# Patient Record
Sex: FEMALE
[De-identification: names, ages, dates, MRNs, and addresses within clinical notes are randomized per-mention and may not be internally consistent; named-entity substitution may affect disease eponyms.]

---

## 2017-06-02 PROBLEM — Z00.00 ENCOUNTER FOR PREVENTIVE HEALTH EXAMINATION: Status: ACTIVE | Noted: 2017-06-02

## 2018-05-15 ENCOUNTER — RESULT REVIEW (OUTPATIENT)
Age: 58
End: 2018-05-15

## 2021-07-22 ENCOUNTER — EMERGENCY (EMERGENCY)
Facility: HOSPITAL | Age: 61
LOS: 1 days | Discharge: HOME | End: 2021-07-22
Attending: EMERGENCY MEDICINE | Admitting: EMERGENCY MEDICINE
Payer: MEDICAID

## 2021-07-22 VITALS
OXYGEN SATURATION: 100 % | HEART RATE: 94 BPM | RESPIRATION RATE: 18 BRPM | SYSTOLIC BLOOD PRESSURE: 131 MMHG | DIASTOLIC BLOOD PRESSURE: 94 MMHG

## 2021-07-22 VITALS
DIASTOLIC BLOOD PRESSURE: 76 MMHG | RESPIRATION RATE: 16 BRPM | OXYGEN SATURATION: 100 % | SYSTOLIC BLOOD PRESSURE: 129 MMHG | TEMPERATURE: 98 F | HEART RATE: 96 BPM

## 2021-07-22 PROCEDURE — 99284 EMERGENCY DEPT VISIT MOD MDM: CPT

## 2021-07-22 NOTE — ED PROVIDER NOTE - PATIENT PORTAL LINK FT
You can access the FollowMyHealth Patient Portal offered by [facility] by registering at the following website: [email or web address]. By joining [employer]’s FollowMyHealth portal, you will also be able to view your health information using other applications (apps) compatible with our system.

## 2021-07-22 NOTE — ED ADULT NURSE NOTE - OBJECTIVE STATEMENT
61 year old female presents A&Ox4, brought in by EMS. [place]y [place]. Pt states she smoked marijuana about an hour ago, and ya dust. Pt denies any other drug use. States this is not her first time smoking marijuana, she smokes it daily. Pt complaining of epigastric pain 2/10 that started yesterday morning. Even unlabored respirations, sating 98% on room air, speaking in full sentences. Pt denies any chest pain, dyspnea, dizziness, blurry vision, nausea, vomiting, diarrhea, recent falls, loss of consciousness, trauma to head neck or spine. Pt in NAD at this time. 61 year old female presents A&Ox4, brought in by EMS. Pt was found in the train station with unsteady gait. Pt states she smoked marijuana about an hour ago, and ya dust. Pt denies any other drug use. States this is not her first time smoking marijuana, she smokes it daily. Pt complaining of epigastric pain 2/10 that started yesterday morning. Even unlabored respirations, sating 98% on room air, speaking in full sentences. Pt denies any chest pain, dyspnea, dizziness, blurry vision, nausea, vomiting, diarrhea, recent falls, loss of consciousness, trauma to head neck or spine. Pt in NAD at this time. Vital signs obtained. MD aware of elevated blood pressure. Pt on cardiac monitor - normal sinus rhythm. Awaiting further orders. Will continue to monitor.

## 2021-07-22 NOTE — ED ADULT TRIAGE NOTE - CHIEF COMPLAINT QUOTE
pt brought in for drug use. Pt admits to smoking weed and ya dust. Pt states she has done this before. pt was found on [facility] and [facility] called 911. Pt states she has some epigastric pain. Pt eyes noted to be blood shot.

## 2021-07-22 NOTE — ED PROVIDER NOTE - PROGRESS NOTE DETAILS
Pt feeling much better, clinically sober, passed po and ambulatory trial, asking to be discharged home. Will take bus home.  -Dr. Zamora

## 2021-07-22 NOTE — ED PROVIDER NOTE - NSFOLLOWUPINSTRUCTIONS_ED_ALL_ED_FT
Please stop using illegal drugs as discussed.    Call the number(s) provided for substance abuse services.    Return to the emergency department if you develop any symptoms that are concerning to you.

## 2021-07-22 NOTE — ED PROVIDER NOTE - CLINICAL SUMMARY MEDICAL DECISION MAKING FREE TEXT BOX
1.91 62yo F w/ pmh as above bibems for unsteadiness while in train station likely 2/2 to recent drug use. No reason for emergent psych consult at this time. Pt does not have any family member who can pick her up from ED at this time. Will give food, and reassess in a couple of hours for discharge. Of note, offered pt information for drug detox services - she accepted.

## 2021-07-22 NOTE — ED ADULT NURSE NOTE - CAS DISCH CONDITION
Assessment/Plan:    Anxiety    She has not noticed any major differences since starting the Lexapro 10 mg daily  She does note some improvement for acute episodes of anxiety with hydroxyzine 10 mg  We discussed hydroxyzine dose and I told her she could increase to 20 or 30 mg prn  If she finds higher dose is more effective will give rx for 25 mg tabs  Attention deficit hyperactivity disorder (ADHD), combined type  Well controlled on Adderall  Other headache syndrome    Unfortunately she has noted some increase in headaches since starting birth control pills  She will continue with headache diary  She does fine Maxalt very helpful for true migraine headaches, but she has been needing them usually twice per week  Discussed headache prevention with magnesium, riboflavin and melatonin       Diagnoses and all orders for this visit:    Attention deficit hyperactivity disorder (ADHD), combined type    Anxiety  -     hydrOXYzine HCL (ATARAX) 10 mg tablet; Take 1-3 tabs po up to bid prn anxiety    Intractable chronic migraine without aura and without status migrainosus  -     rizatriptan (MAXALT-MLT) 10 MG disintegrating tablet; May repeat in 2 hours if needed    Chronic tension-type headache, intractable  -     rizatriptan (MAXALT-MLT) 10 MG disintegrating tablet; May repeat in 2 hours if needed    Other headache syndrome    Other orders  -     norgestimate-ethinyl estradiol (ORTHO TRI-CYCLEN LO) 0 18/0 215/0 25 MG-25 MCG per tablet; Take 1 tablet by mouth daily  -     Tri-Lo-Wendy 0 18/0 215/0 25 MG-25 MCG per tablet          Subjective:      Patient ID: Mardelle Fothergill is a 23 y o  female  She is here for follow-up of anxiety along headaches  The Adderall has been working well for her ADHD  She has been on Lexapro  just over a month and has not noticed any major difference in anxiety symptoms  She does find hydroxyzine is helpful for an acute episode        Her gynecologist started her on birth control pills about 3 weeks ago  Unfortunately she notes increase in headaches since she has been taking the pills  She has discussed this with the gynecologist and they have decided that she should continue for a 3 month trial       The Maxalt has been working very well for true migraine headaches  She notes some sleeping difficulty  Usually she falls asleep okay but then wakes up and has trouble returning to sleep  The following portions of the patient's history were reviewed and updated as appropriate: allergies, current medications, past family history, past medical history, past social history, past surgical history and problem list     Review of Systems   Constitutional: Negative for activity change, chills, fatigue and fever  HENT: Negative for congestion, ear pain, sinus pressure and sore throat  Eyes: Negative for pain and visual disturbance  Respiratory: Negative for cough, chest tightness, shortness of breath and wheezing  Cardiovascular: Negative for chest pain, palpitations and leg swelling  Gastrointestinal: Negative for abdominal pain, blood in stool, constipation, diarrhea, nausea and vomiting  Endocrine: Negative for polydipsia and polyuria  Genitourinary: Negative for difficulty urinating, dysuria, frequency and urgency  Musculoskeletal: Negative for arthralgias, joint swelling and myalgias  Skin: Negative for rash  Neurological: Positive for headaches  Negative for dizziness, weakness and numbness  Hematological: Negative for adenopathy  Does not bruise/bleed easily  Psychiatric/Behavioral: Positive for sleep disturbance  Negative for dysphoric mood  The patient is not nervous/anxious  Objective:      /74 (BP Location: Left arm, Patient Position: Sitting, Cuff Size: Standard)   Pulse 84   Resp 12   Ht 5' 5" (1 651 m)   Wt 52 4 kg (115 lb 9 6 oz)   SpO2 98%   BMI 19 24 kg/m²          Physical Exam  Vitals signs and nursing note reviewed  Constitutional:       Appearance: Normal appearance  HENT:      Head: Normocephalic and atraumatic  Cardiovascular:      Rate and Rhythm: Normal rate and regular rhythm  Pulmonary:      Effort: Pulmonary effort is normal       Breath sounds: Normal breath sounds  Musculoskeletal:      Right lower leg: No edema  Left lower leg: No edema  Neurological:      Mental Status: She is alert  Stable

## 2021-07-22 NOTE — ED PROVIDER NOTE - PHYSICAL EXAMINATION
Gen: Alert, NAD  Head: NC, AT,  EOMI, normal lids/conjunctiva  ENT:  normal hearing, patent oropharynx without erythema/exudate  Neck: +supple, no tenderness/meningismus/JVD, +Trachea midline  Chest: no chest wall tenderness, equal chest rise  Pulm: Bilateral BS, normal resp effort, no wheeze/stridor/retractions  CV: mildly tachy, no M/R/G, +dist pulses  Abd: +BS, soft, NT/ND  Rectal: deferred  Mskel: no edema/erythema/cyanosis  Skin: no rash  Neuro: AAOx3, no sensory/motor deficits, CN 2-12 intact

## 2021-07-22 NOTE — ED PROVIDER NOTE - ATTENDING CONTRIBUTION TO CARE
History and physical above obtained and documented (or dictated) by me (attending physician).  Resident involved in case for assistance with disposition and may document involvement as necessary via progress note(s).   -Dr. Zamora

## 2021-07-22 NOTE — ED ADULT NURSE NOTE - CHIEF COMPLAINT QUOTE
pt brought in for drug use. Pt admits to smoking weed and ya dust. Pt states she has done this before. pt was found on [facility] and [facility] called 911. Pt states she has some epigastric pain. Pt eyes noted to be blood shot. 100

## 2021-07-22 NOTE — ED PROVIDER NOTE - OBJECTIVE STATEMENT
Pertinent PMH/PSH/FHx/SHx and Review of Systems contained within:  62yo F w/ pmh of htn and hypothryoidism; PPH of depression, bibems after seen stumbling and unsteady in train station. Pt endorses smoking cannabis multiple times per week and smoking pcp occasionally. States she smoked pcp today because she was feeling lonely and "stressed about all of the crazy things happening on the news". Denies SI/HI/AVH. Denies recent etoh use or any hx of etoh abuse/withdrawal. States time of last drug use was while she was waiting on the train platform a couple of hours ago. Feels much better now but still not completely back to baseline. No physical complaints at this time other than being hungry and asking for food.    No fever/chills, No photophobia/eye pain/changes in vision, No ear pain/sore throat/dysphagia, No chest pain/palpitations, no SOB/cough/wheeze/stridor, No abdominal pain, No N/V/D, no dysuria/frequency/discharge, No neck/back pain, no rash, no new focal neuro symptoms.

## 2021-07-22 NOTE — ED ADULT NURSE NOTE - NSIMPLEMENTINTERV_GEN_ALL_ED
Implemented All Fall Risk Interventions:  [place] to call system. Call bell, personal items and telephone within reach. Instruct patient to call for assistance. Room bathroom lighting operational. Non-slip footwear when patient is off stretcher. Physically safe environment: no spills, clutter or unnecessary equipment. Stretcher in lowest position, wheels locked, appropriate side rails in place. Provide visual cue, wrist band, yellow gown, etc. Monitor gait and stability. Monitor for mental status changes and reorient to person, place, and time. Review medications for side effects contributing to fall risk. Reinforce activity limits and safety measures with patient and family.

## 2021-08-03 ENCOUNTER — EMERGENCY (EMERGENCY)
Facility: HOSPITAL | Age: 61
LOS: 1 days | Discharge: HOME | End: 2021-08-03
Attending: EMERGENCY MEDICINE | Admitting: EMERGENCY MEDICINE
Payer: MEDICAID

## 2021-08-03 VITALS
OXYGEN SATURATION: 100 % | HEART RATE: 82 BPM | DIASTOLIC BLOOD PRESSURE: 70 MMHG | TEMPERATURE: 98 F | RESPIRATION RATE: 16 BRPM | SYSTOLIC BLOOD PRESSURE: 136 MMHG

## 2021-08-03 PROCEDURE — 99282 EMERGENCY DEPT VISIT SF MDM: CPT

## 2021-08-03 NOTE — ED ADULT TRIAGE NOTE - CHIEF COMPLAINT QUOTE
presents for pain "all over" as per EMS patient reports having no AC at home and  wants a place to stay. PMH HTN and unknown Psych history.

## 2021-08-04 VITALS
TEMPERATURE: 98 F | OXYGEN SATURATION: 100 % | RESPIRATION RATE: 17 BRPM | HEART RATE: 81 BPM | SYSTOLIC BLOOD PRESSURE: 133 MMHG | DIASTOLIC BLOOD PRESSURE: 92 MMHG

## 2021-08-04 NOTE — ED PROVIDER NOTE - PATIENT PORTAL LINK FT
None available
You can access the FollowMyHealth Patient Portal offered by [facility] by registering at the following website: [email or web address]. By joining [employer]’s FollowMyHealth portal, you will also be able to view your health information using other applications (apps) compatible with our system.

## 2021-08-04 NOTE — ED PROVIDER NOTE - PHYSICAL EXAMINATION
Gen: Well appearing and in NAD.  Head: normal appearing atraumatic.   Neck: trachea midline.  Resp:  No respiratory distress.  Abd; soft NT ND.  Ext: no visible deformities.  Neuro:  Alert and oriented, appears non focal.  Skin:  Warm and dry as visualized.  Psych:  Normal affect and mood.

## 2021-08-04 NOTE — ED PROVIDER NOTE - OBJECTIVE STATEMENT
62 yo F with hx of HTN 62 yo F with hx of HTN BIBEMS initially presenting with diffuse body pain, states that AC was not working and wanted place to stay. No acute complaints.

## 2021-08-04 NOTE — ED PROVIDER NOTE - NS ED ROS FT
Constitutional:  (-) fever, (-) chills, (-) lethargy  Eyes:  (-) eye pain (-) visual changes  ENMT: (-) nasal discharge, (-) sore throat. (-) neck pain or stiffness  Cardiac: (-) chest pain (-) palpitations  Respiratory:  (-) cough (-) shortness of breath  GI:  (-) nausea (-) vomiting (-) diarrhea (-) abdominal pain  :  (-) dysuria (-) frequency (-) burning  MS:  (-) back pain (-) joint pain  Neuro:  (-) headache (-) numbness (-) tingling (-) focal weakness  Skin:  (-) rash  Except as documented in the HPI,  all other systems are negative

## 2021-08-04 NOTE — ED PROVIDER NOTE - NSFOLLOWUPINSTRUCTIONS_ED_ALL_ED_FT
You were evaluated in the Emergency Department for medical screening.     There are no signs of emergency conditions requiring admission to the hospital on today's workup.      We recommend that you:  1. See your primary care physician within the next week for follow up.  Bring a copy of your discharge paperwork (including any test results) to your doctor.      *** Return immediately if you have any other new/concerning symptoms. ***

## 2021-08-04 NOTE — ED PROVIDER NOTE - ATTENDING CONTRIBUTION TO CARE
60 yo with HTN presenting after calling EMS and stating she was having full body pain but currently stating that she is not in pain and that her AC is broken and needed a place to stay that was cool.  No other complaints    Gen:  Well appearing in NAD  Head:  NC/AT  Resp: No distress   Ext: no deformities  Skin: warm and dry as visualized     60 yo here for lack of AC.  No acute medical interventions needed.  Will allow patient to sleep in ED and attempt to dispo in the AM.
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
## 2021-08-04 NOTE — ED ADULT NURSE REASSESSMENT NOTE - NS ED NURSE REASSESS COMMENT FT1
Pt A&Ox4 resting in bed. Even unlabored respirations, speaking in full sentences without any difficulty, no accessory muscle use. Pt denies any chest pain, dyspnea, dizziness, blurry vision, nausea, vomiting, diarrhea. Pt refusing vital  signs at this time, states "I just need to leave this place." Pt in NAD at this time. Awaiting discharge.

## 2021-08-04 NOTE — ED ADULT NURSE NOTE - OBJECTIVE STATEMENT
Break covering RN: 60 y/o F received to room 24 c/o generalized body pain. Pt respirations even and unlabored. pt states pain started earlier this morning. Pt refusing to participate in assessment at this time. Pt respirations even and unlabored. pt abd soft nontender nondistended. pt skin intact. Pt denies fevers, chills, sob, quach ha, or cp. VS as noted, call bell in reach, comfort measures provided, will continue to monitor.

## 2021-08-04 NOTE — ED PROVIDER NOTE - CLINICAL SUMMARY MEDICAL DECISION MAKING FREE TEXT BOX
62 yo F with hx of HTN BIBEMS initially presenting with diffuse body pain, states that AC was not working and wanted place to stay. Upon evaluation patient resting comfortably, vss, no acute complaints. No additional testing indicated at this time. Will reassess.

## 2021-08-16 ENCOUNTER — EMERGENCY (EMERGENCY)
Facility: HOSPITAL | Age: 61
LOS: 1 days | Discharge: HOME | End: 2021-08-16
Attending: STUDENT IN AN ORGANIZED HEALTH CARE EDUCATION/TRAINING PROGRAM | Admitting: STUDENT IN AN ORGANIZED HEALTH CARE EDUCATION/TRAINING PROGRAM
Payer: MEDICAID

## 2021-08-16 VITALS
TEMPERATURE: 98 F | HEART RATE: 89 BPM | OXYGEN SATURATION: 99 % | SYSTOLIC BLOOD PRESSURE: 122 MMHG | DIASTOLIC BLOOD PRESSURE: 98 MMHG | RESPIRATION RATE: 18 BRPM

## 2021-08-16 PROCEDURE — 99283 EMERGENCY DEPT VISIT LOW MDM: CPT

## 2021-08-16 NOTE — ED PROVIDER NOTE - OBJECTIVE STATEMENT
Karina RUVALCABA: 61F hx of HTN, presents to ED s/p arrest for shop lifting, told PDs she needed her meds refilled prompting ED visit, pt requesting Seroquel and oxycodone, otherwise has no physical complaints, frequently coming to staff desk asking for a metro card and a sandwich and wants to leave. She has no physical complaints at this time. Denies n/v/f/c/cp/sob. Denies headache, syncope, lightheadedness, dizziness. Denies chest palpitations, abdominal pain. Denies edema. Denies dysuria, hematuria, BRBPR, tarry stools, diarrhea, constipation.

## 2021-08-16 NOTE — ED PROVIDER NOTE - ATTENDING CONTRIBUTION TO CARE
I have personally performed a face to face medical and diagnostic evaluation of the patient. I have discussed with and reviewed the Fellow's note and agree with the History, ROS, Physical Exam and MDM unless otherwise indicated. A brief summary of my personal evaluation and impression can be found below.    Karina RUVALCABA: Please see the HPI, ROS, PE and MDM as authored by me.

## 2021-08-16 NOTE — ED PROVIDER NOTE - PATIENT PORTAL LINK FT
You can access the FollowMyHealth Patient Portal offered by [facility] by registering at the following website: [email or web address]. By joining [employer]’s FollowMyHealth portal, you will also be able to view your health information using other applications (apps) compatible with our system.

## 2021-08-16 NOTE — ED ADULT TRIAGE NOTE - CHIEF COMPLAINT QUOTE
Pt brought in by EMS from [employer] [facility] [employer]. Pt was shoplifting but told PD that she needed her medications refilled. Pt states she has not had any of her medications in 3 weeks. Pt denies any complaints.

## 2021-08-16 NOTE — ED PROVIDER NOTE - CLINICAL SUMMARY MEDICAL DECISION MAKING FREE TEXT BOX
Karina RUVALCABA:61F hx of HTN, presents to ED s/p arrest for shop lifting, told PDs she needed her meds refilled prompting ED visit, pt requesting Seroquel and oxycodone, otherwise has no physical complaints, frequently coming to staff desk asking for a metro card and a sandwich and wants to leave. She has no physical complaints at this time. exam vss non toxic PE as above well appearing tolerating PO, asking to be dc'd, instructed pt to follow up w PMD for med refill, that oxy will not be refilled in ED. agrees w plan for dc. Karina RUVALCABA:61F hx of HTN, presents to ED s/p arrest for shop lifting, told PDs she needed her meds refilled prompting ED visit, pt requesting Seroquel and oxycodone, otherwise has no physical complaints, frequently coming to staff desk asking for a metro card and a sandwich and wants to leave. She has no physical complaints at this time. exam vss non toxic PE as above well appearing tolerating PO, asking to be dc'd, instructed pt to follow up w PMD for med refill, that oxy will not be refilled in ED. agrees w plan for dc. Agrees to follow up with PCP to discuss outpatient management of her seroquel.

## 2021-08-16 NOTE — ED PROVIDER NOTE - PHYSICAL EXAMINATION
Karina RUVALCABA:  VITALS: Initial triage and subsequent vitals have been reviewed by me.  GEN APPEARANCE: WDWN, alert and cooperative, non-toxic appearing and in NAD  HEAD: Atraumatic, normocephalic   EYES: PERRLa, EOMI, vision grossly intact.   EARS: Gross hearing intact.   NOSE: No nasal discharge, no external evidence of epistaxis.   NECK: Supple  CV: RRR, S1S2, no c/r/m/g. No cyanosis or pallor. Extremities warm, well perfused. Cap refill <2 seconds. No bruits.   LUNGS: CTAB. No wheezing. No rales. No rhonchi. No diminished breath sounds.   ABDOMEN: Soft, NTND. No guarding or rebound. No masses.   MSK/EXT: Spine appears normal, no spine point tenderness. No CVA ttp. Normal muscular development. Pelvis stable. No obvious joint or bony deformity, no peripheral edema.   NEURO: Alert, follows commands. Weight bearing normal. Speech normal. Sensation and motor normal x4 extremities.   SKIN: Normal color for race, warm, dry and intact. No evidence of rash.  PSYCH: Normal mood and affect.

## 2021-08-20 ENCOUNTER — EMERGENCY (EMERGENCY)
Facility: HOSPITAL | Age: 61
LOS: 1 days | Discharge: HOME | End: 2021-08-20
Attending: EMERGENCY MEDICINE | Admitting: EMERGENCY MEDICINE
Payer: MEDICAID

## 2021-08-20 VITALS
RESPIRATION RATE: 18 BRPM | OXYGEN SATURATION: 99 % | TEMPERATURE: 98 F | SYSTOLIC BLOOD PRESSURE: 138 MMHG | HEART RATE: 90 BPM | DIASTOLIC BLOOD PRESSURE: 90 MMHG

## 2021-08-20 VITALS
DIASTOLIC BLOOD PRESSURE: 94 MMHG | HEART RATE: 71 BPM | TEMPERATURE: 98 F | SYSTOLIC BLOOD PRESSURE: 149 MMHG | RESPIRATION RATE: 16 BRPM | OXYGEN SATURATION: 100 %

## 2021-08-20 DIAGNOSIS — F31.9 BIPOLAR DISORDER, UNSPECIFIED: ICD-10-CM

## 2021-08-20 DIAGNOSIS — F12.10 CANNABIS ABUSE, UNCOMPLICATED: ICD-10-CM

## 2021-08-20 DIAGNOSIS — F16.10 HALLUCINOGEN ABUSE, UNCOMPLICATED: ICD-10-CM

## 2021-08-20 DIAGNOSIS — F43.10 POST-TRAUMATIC STRESS DISORDER, UNSPECIFIED: ICD-10-CM

## 2021-08-20 PROCEDURE — 99284 EMERGENCY DEPT VISIT MOD MDM: CPT

## 2021-08-20 PROCEDURE — 90792 PSYCH DIAG EVAL W/MED SRVCS: CPT

## 2021-08-20 RX ORDER — ASPIRIN/CALCIUM CARB/MAGNESIUM 324 MG
1 TABLET ORAL
Qty: 30 | Refills: 0
Start: 2021-08-20 | End: 2021-09-18

## 2021-08-20 RX ORDER — HYDROXYZINE HCL 10 MG
1 TABLET ORAL
Qty: 16 | Refills: 0
Start: 2021-08-20 | End: 2021-08-23

## 2021-08-20 RX ORDER — ATORVASTATIN CALCIUM 80 MG/1
1 TABLET, FILM COATED ORAL
Qty: 30 | Refills: 0
Start: 2021-08-20 | End: 2021-09-18

## 2021-08-20 RX ORDER — QUETIAPINE FUMARATE 200 MG/1
1 TABLET, FILM COATED ORAL
Qty: 14 | Refills: 0
Start: 2021-08-20 | End: 2021-09-02

## 2021-08-20 RX ORDER — QUETIAPINE FUMARATE 200 MG/1
25 TABLET, FILM COATED ORAL ONCE
Refills: 0 | Status: COMPLETED | OUTPATIENT
Start: 2021-08-20 | End: 2021-08-20

## 2021-08-20 RX ORDER — FENOFIBRATE,MICRONIZED 130 MG
1 CAPSULE ORAL
Qty: 30 | Refills: 0
Start: 2021-08-20 | End: 2021-09-18

## 2021-08-20 RX ORDER — LOSARTAN/HYDROCHLOROTHIAZIDE 100MG-25MG
1 TABLET ORAL
Qty: 14 | Refills: 0
Start: 2021-08-20 | End: 2021-09-02

## 2021-08-20 RX ORDER — LEVOTHYROXINE SODIUM 125 MCG
1 TABLET ORAL
Qty: 30 | Refills: 0
Start: 2021-08-20 | End: 2021-09-18

## 2021-08-20 RX ADMIN — QUETIAPINE FUMARATE 25 MILLIGRAM(S): 200 TABLET, FILM COATED ORAL at 13:51

## 2021-08-20 NOTE — ED BEHAVIORAL HEALTH ASSESSMENT NOTE - VIOLENCE PROTECTIVE FACTORS:
Relationship stability/Sobriety Relationship stability/Sobriety/Insight into violence risk and need for management/treatment

## 2021-08-20 NOTE — ED BEHAVIORAL HEALTH ASSESSMENT NOTE - OTHER PAST PSYCHIATRIC HISTORY (INCLUDE DETAILS REGARDING ONSET, COURSE OF ILLNESS, INPATIENT/OUTPATIENT TREATMENT)
History of bipolar I disorder, depression, cannabis use disorder, PCP use. Seeks counseling at [facility]. Recently ran out of Seroquel and requesting more. History of bipolar I disorder, depression, cannabis use disorder, PCP use. Seeks counseling at [facility]. Recently ran out of Seroquel and requesting more.  - recently seen at our [facility] ED requesting for refill of her seroquel
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
## 2021-08-20 NOTE — ED BEHAVIORAL HEALTH ASSESSMENT NOTE - SAFETY PLAN ADDT'L DETAILS
Safety plan discussed with.../Education provided regarding environmental safety / lethal means restriction/Provision of National Suicide Prevention Lifeline 0-698-372-WOLT (8037)

## 2021-08-20 NOTE — ED BEHAVIORAL HEALTH ASSESSMENT NOTE - NSPRESENTSXS_PSY_ALL_CORE
Depressed mood/Anhedonia/Psychosis/Impulsivity/Hopelessness or despair/Global insomnia Depressed mood/Anhedonia/Psychosis/Impulsivity/Hopelessness or despair/Global insomnia/Severe anxiety, agitation or panic

## 2021-08-20 NOTE — ED BEHAVIORAL HEALTH ASSESSMENT NOTE - CASE SUMMARY
61/F with reported hx of bipolar disorder and depression; as per Psyckes: other diagnoses include MDD, anxiety disorder NOS and unspecified bipolar disorder.  Pt has had no past psych admissions (within the last 5 yrs as per Psyckes); no reported psychiatric hospitalizations, no prior suicide attempts and hx of PCP and cannabis use disorder. Today, presented to the ED BIB EMS after her CM activated 911 as Pt had verbalized SI (with no plans raised to the CM)    At this time, the Pt is endorsing symptoms of depression and anxiety as precipitated and perpetuated by multiple psychosocial stressors.. there is also element of paranoia but this is most likely consequential of her illicit substance abuse rather than attributed to a primary psychotic or affective disorder.  Since her  ED arrival, the Pt has been calm and cooperative.  There has been no agitation/aggressive behavior.  No verbalization of active/ passive SI/HI.   There are no signs/symptoms of severe MDD, acute dawna or florid psychosis.  Pt is not showing any signs/symptoms of intoxication or withdrawal.  She is not delirious.  Pt has not tested limits.. Has maintained appropriate boundaries. Pt has been easily redirected.  Overall, there has been no management issues. at this time, there is no cause for Pt to be psychiatrically admitted.  He is not acutely manic; not psychotic; is not suicidal or homicidal.  Her passive SI had previously been driven due to her early/middle insomnia - as well as not being compliant with meds and psych aftercare.  Pt may be discharged back to the community    RECOMMENDATIONS:   1. Psychoeducation provided.  Encouraged follow up with OP psych services.  No indication for emergent psych meds at this time. Discussed role of psychotherapy.  Pt expressed that she is open to this once he is able to establish an out Pt psychiatry clinic follow up.    2. Emergency protocol reviewed.  Pt was adviced to call 911 or come to the nearest ED should symptoms worsen; have increasing bouts of agitation/aggressive behavior; having SI/HI; or [phone]    3. given resources to the community like [facility] walk in Crisis centre, other OP psych clinics within Pt's community  4. was prescribed with Seroquel 25mg HS and Atarax 50mg PO q6Hrs PRN

## 2021-08-20 NOTE — ED BEHAVIORAL HEALTH ASSESSMENT NOTE - NSSUICPROTFACT_PSY_ALL_CORE
Responsibility to children, family, or others/Identifies reasons for living/Supportive social network of family or friends Responsibility to children, family, or others/Identifies reasons for living/Supportive social network of family or friends/Positive therapeutic relationships/Church beliefs

## 2021-08-20 NOTE — ED PROVIDER NOTE - PATIENT PORTAL LINK FT
You can access the FollowMyHealth Patient Portal offered by [facility] by registering at the following website: [email or web address]. By joining [employer]’s FollowMyHealth portal, you will also be able to view your health information using other applications (apps) compatible with our system.

## 2021-08-20 NOTE — ED BEHAVIORAL HEALTH ASSESSMENT NOTE - RISK ASSESSMENT
High Acute Suicide Risk Risk factors include older age, history of depression, delusional thought content, recent suicide attempt, no spouse or friends, history of trauma, impulsivity, sleep disturbances, and unstable housing.    Protective factors include no access to firearms, insight into disease, strong relationships with sister, son, and grandchildren. Low Acute Suicide Risk Risk factors include older age, history of depression, delusional thought content, recent suicide attempt, no spouse or friends, history of trauma, impulsivity, sleep disturbances, and ? unstable housing.    Protective factors include no access to firearms, insight into disease, strong relationships with sister, son, and grandchildren.      Given above, the Pt is currently at low acute suicide risk but remains at chronically elevated risk of self-harm.    At this time, there are no identifiable acute increase in risk(s) that would be mitigated by an involuntary psychiatric admission.
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
## 2021-08-20 NOTE — ED BEHAVIORAL HEALTH ASSESSMENT NOTE - NSHISTORFACTOR_PSY_ALL_CORE
Family History of psychiatric diagnoses requiring hospitalization/History of abuse/trauma/History of Impulsivity
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
## 2021-08-20 NOTE — ED BEHAVIORAL HEALTH ASSESSMENT NOTE - VIOLENCE RISK FACTORS:
Affective dysregulation/Impulsivity/History of being victimized/traumatized/Community stressors that increase the risk of destabilization/Irritability Substance abuse/Affective dysregulation/Impulsivity/History of being victimized/traumatized/Noncompliance with treatment/Community stressors that increase the risk of destabilization/Irritability

## 2021-08-20 NOTE — BH SAFETY PLAN - STEP 5 CRISIS
Called pt for clarification. Pt states she thinks it's her Cymbalta. Per pt it's because she is on tramadol so they will not fill it.   I called the [place] pharmacy on [address]. Pt just needs a PA for the Cymbalta. Pt has [employer] insurance. ID# is [id number].   I called pt back to let her know what was needed.Tried to complete PA completed via Cover My Meds but it would not go through. Paper PA faxed to [employer].   .

## 2021-08-20 NOTE — ED PROVIDER NOTE - CLINICAL SUMMARY MEDICAL DECISION MAKING FREE TEXT BOX
60 y/o female with PMHx of Depression, HTN, and Hypothyroidism who presented to the ED from group home for SI and difficulty sleeping X days.  Concern for depression/SI/out of medication  Psych eval
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
## 2021-08-20 NOTE — ED BEHAVIORAL HEALTH ASSESSMENT NOTE - ADDITIONAL DETAILS ALL
Patient denies history of SIIP/HIIP prior to suicide attempt this morning. Patient denies history of SIIP/HIIP prior to an ? "interrupted " [place] - unclear as she was unable to recall full details; was evasive when writer attempted to explore re: abusing drugs during this time

## 2021-08-20 NOTE — ED ADULT TRIAGE NOTE - CHIEF COMPLAINT QUOTE
Patient brought to ER by EMS from [facility] facility for c/o suicidal ideations, noncompliance with her meds for the past two weeks.

## 2021-08-20 NOTE — ED BEHAVIORAL HEALTH ASSESSMENT NOTE - MODIFICATIONS
I have seen and examined the Pt with Dr IRVIN Ardon and performed palma elements of the History, Mental Status Examination and Physical Examination.  I concur with his assessment and recommendations apart from my additional recommendations as mentioned below. I have discussed the Pt's assessment and plan of care with Dr IRVIN Ardon.   I agree with the note as stated above, having amended the EMR as needed to reflect my findings.  This includes during the time I functioned as the attending physician for this Pt at the -ED of [facility].

## 2021-08-20 NOTE — ED BEHAVIORAL HEALTH NOTE - BEHAVIORAL HEALTH NOTE
As per provider, worker provided [employer] card #[phone] for travel. As per provider, worker provided [employer] card #[phone] for travel. Patient lives in independent living through [facility].
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
## 2021-08-20 NOTE — ED BEHAVIORAL HEALTH ASSESSMENT NOTE - REFERRAL / APPOINTMENT DETAILS
encouraged to follow up with OP psych services. was provided with [facility] contact details. gave instructions to CM to have Pt set up with nearby clinic

## 2021-08-20 NOTE — ED ADULT NURSE REASSESSMENT NOTE - NS ED NURSE REASSESS COMMENT FT1
pt cleared by psych d/c by provider pt denies si/hi/avh presently, verbalizing understanding of d/c instructions.
